# Patient Record
Sex: MALE | Race: WHITE | Employment: OTHER | URBAN - METROPOLITAN AREA
[De-identification: names, ages, dates, MRNs, and addresses within clinical notes are randomized per-mention and may not be internally consistent; named-entity substitution may affect disease eponyms.]

---

## 2018-12-14 RX ORDER — FLUTICASONE PROPIONATE 50 MCG
2 SPRAY, SUSPENSION (ML) NASAL
COMMUNITY

## 2018-12-14 NOTE — PERIOP NOTES
[address], [address] [facility]                            MAIN OR                                  [phone]   MAIN PRE OP                          [phone]                                                                                AMBULATORY PRE OP          [phone]  PRE-ADMISSION TESTING    [phone]     Surgery Date:   1/9/2018      Is surgery arrival time given by surgeon? NO  If NO, [facility] staff will call you between 3 and 7pm the day before your surgery with your arrival time. (If your surgery is on a Monday, we will call you the Friday before.)    Call [phone] after 7pm Monday-Friday if you did not receive your arrival time. INSTRUCTIONS BEFORE YOUR SURGERY   When You  Arrive   Arrive at the [address] [facility] on the day of your surgery  Have your insurance card, photo ID, and any copayment (if needed)     Food   and   Drink   NO food or drink after midnight the night before surgery    This means NO water, gum, mints, coffee, juice, etc.  No alcohol (beer, wine, liquor) 24 hours before and after surgery     Medications to   TAKE   Morning of Surgery   MEDICATIONS TO TAKE THE MORNING OF SURGERY WITH A SIP OF WATER:    Flonase, Zyrtec     Medications  To  STOP      7 days before surgery    Non-Steroidal anti-inflammatory Drugs (NSAID's): for example, Ibuprofen (Advil, Motrin), Naproxen (Aleve)   Aspirin, if taking for pain    Herbal supplements, vitamins, and fish oil   Other: Meloxicam  (Pain medications not listed above, including Tylenol may be taken)   Blood  Thinners    If you take  Aspirin, Plavix, Coumadin, or any blood-thinning or anti-blood clot medicine, talk to the doctor who prescribed the medications for pre-operative instructions.      Bathing Clothing  Jewelry  Valuables       If you shower the morning of surgery, please do not apply anything to your skin (lotions, powders, deodorant, or makeup, especially mascara)   Follow all special bath instructions (for total joint replacement, spine and bowel surgeries)   Do not shave or trim anywhere 24 hours before surgery   Wear your hair loose or down; no pony-tails, buns, or metal hair clips   Wear loose, comfortable, clean clothes   Wear glasses instead of contacts   Leave money, valuables, and jewelry, including body piercings, at home     Going Home       or Spending the Night    SAME-DAY SURGERY: You must have a responsible adult drive you home and stay with you 24 hours after surgery   ADMITS: If your doctor is keeping you into the hospital after surgery, leave personal belongings/luggage in your car until you have a hospital room number. Hospital discharge time is 12 noon  Drivers must be here before 12 noon unless you are told differently   Special Instructions Free  parking 7am-5pm         Follow all instructions so your surgery wont be cancelled. Please, be on time. If a situation occurs and you are delayed the day of surgery, call [phone]. If your physical condition changes (like a fever, cold, flu, etc.) call your surgeon. The patient was contacted  via phone. The patient verbalizes understanding of all instructions and does not  need reinforcement.

## 2019-01-22 ENCOUNTER — ANESTHESIA EVENT (OUTPATIENT)
Dept: SURGERY | Age: 69
End: 2019-01-22
Payer: MEDICARE

## 2019-01-23 ENCOUNTER — HOSPITAL ENCOUNTER (OUTPATIENT)
Age: 69
Setting detail: OUTPATIENT SURGERY
Discharge: HOME | End: 2019-01-23
Attending: ORTHOPAEDIC SURGERY | Admitting: ORTHOPAEDIC SURGERY
Payer: MEDICARE

## 2019-01-23 ENCOUNTER — ANESTHESIA (OUTPATIENT)
Dept: SURGERY | Age: 69
End: 2019-01-23
Payer: MEDICARE

## 2019-01-23 VITALS
HEART RATE: 57 BPM | OXYGEN SATURATION: 97 % | WEIGHT: 174.38 LBS | SYSTOLIC BLOOD PRESSURE: 143 MMHG | HEIGHT: 71 IN | RESPIRATION RATE: 15 BRPM | DIASTOLIC BLOOD PRESSURE: 92 MMHG | TEMPERATURE: 97.6 F | BODY MASS INDEX: 24.41 KG/M2

## 2019-01-23 DIAGNOSIS — G56.02 CARPAL TUNNEL SYNDROME ON LEFT: Primary | ICD-10-CM

## 2019-01-23 PROCEDURE — A4565 SLINGS: HCPCS

## 2019-01-23 PROCEDURE — 77030002986 HC SUT PROL J&J -A: Performed by: ORTHOPAEDIC SURGERY

## 2019-01-23 PROCEDURE — 74011000272 HC RX REV CODE- 272: Performed by: ORTHOPAEDIC SURGERY

## 2019-01-23 PROCEDURE — 77030003601 HC NDL NRV BLK BBMI -A

## 2019-01-23 PROCEDURE — 74011250636 HC RX REV CODE- 250/636: Performed by: ANESTHESIOLOGY

## 2019-01-23 PROCEDURE — 77030018836 HC SOL IRR NACL ICUM -A: Performed by: ORTHOPAEDIC SURGERY

## 2019-01-23 PROCEDURE — 77030006602 HC BLD CRPL AGEE MCRA -C: Performed by: ORTHOPAEDIC SURGERY

## 2019-01-23 PROCEDURE — 77030006689 HC BLD OPHTH BVR BD -A: Performed by: ORTHOPAEDIC SURGERY

## 2019-01-23 PROCEDURE — 77030020782 HC GWN BAIR PAWS FLX 3M -B

## 2019-01-23 PROCEDURE — 76060000073 HC AMB SURG ANES FIRST 0.5 HR: Performed by: ORTHOPAEDIC SURGERY

## 2019-01-23 PROCEDURE — 74011000250 HC RX REV CODE- 250: Performed by: ORTHOPAEDIC SURGERY

## 2019-01-23 PROCEDURE — 76210000046 HC AMBSU PH II REC FIRST 0.5 HR: Performed by: ORTHOPAEDIC SURGERY

## 2019-01-23 PROCEDURE — 74011250636 HC RX REV CODE- 250/636

## 2019-01-23 PROCEDURE — 74011250636 HC RX REV CODE- 250/636: Performed by: ORTHOPAEDIC SURGERY

## 2019-01-23 PROCEDURE — 76030000002 HC AMB SURG OR TIME FIRST 0.: Performed by: ORTHOPAEDIC SURGERY

## 2019-01-23 PROCEDURE — 77030000032 HC CUF TRNQT ZIMM -B: Performed by: ORTHOPAEDIC SURGERY

## 2019-01-23 PROCEDURE — 76210000034 HC AMBSU PH I REC 0.5 TO 1 HR: Performed by: ORTHOPAEDIC SURGERY

## 2019-01-23 RX ORDER — HYDROMORPHONE HYDROCHLORIDE 1 MG/ML
.25-1 INJECTION, SOLUTION INTRAMUSCULAR; INTRAVENOUS; SUBCUTANEOUS
Status: DISCONTINUED | OUTPATIENT
Start: 2019-01-23 | End: 2019-01-23 | Stop reason: HOSPADM

## 2019-01-23 RX ORDER — NALOXONE HYDROCHLORIDE 0.4 MG/ML
0.04 INJECTION, SOLUTION INTRAMUSCULAR; INTRAVENOUS; SUBCUTANEOUS
Status: DISCONTINUED | OUTPATIENT
Start: 2019-01-23 | End: 2019-01-23 | Stop reason: HOSPADM

## 2019-01-23 RX ORDER — FENTANYL CITRATE 50 UG/ML
INJECTION, SOLUTION INTRAMUSCULAR; INTRAVENOUS AS NEEDED
Status: DISCONTINUED | OUTPATIENT
Start: 2019-01-23 | End: 2019-01-23 | Stop reason: HOSPADM

## 2019-01-23 RX ORDER — CEFAZOLIN SODIUM/WATER 2 G/20 ML
SYRINGE (ML) INTRAVENOUS
Status: DISCONTINUED
Start: 2019-01-23 | End: 2019-01-23 | Stop reason: WASHOUT

## 2019-01-23 RX ORDER — SODIUM CHLORIDE 0.9 % (FLUSH) 0.9 %
5-40 SYRINGE (ML) INJECTION AS NEEDED
Status: DISCONTINUED | OUTPATIENT
Start: 2019-01-23 | End: 2019-01-23 | Stop reason: HOSPADM

## 2019-01-23 RX ORDER — SODIUM CHLORIDE 0.9 % (FLUSH) 0.9 %
5-40 SYRINGE (ML) INJECTION EVERY 8 HOURS
Status: DISCONTINUED | OUTPATIENT
Start: 2019-01-23 | End: 2019-01-23 | Stop reason: HOSPADM

## 2019-01-23 RX ORDER — PROPOFOL 10 MG/ML
INJECTION, EMULSION INTRAVENOUS
Status: DISCONTINUED | OUTPATIENT
Start: 2019-01-23 | End: 2019-01-23 | Stop reason: HOSPADM

## 2019-01-23 RX ORDER — LIDOCAINE HYDROCHLORIDE 10 MG/ML
0.1 INJECTION, SOLUTION EPIDURAL; INFILTRATION; INTRACAUDAL; PERINEURAL AS NEEDED
Status: DISCONTINUED | OUTPATIENT
Start: 2019-01-23 | End: 2019-01-23 | Stop reason: HOSPADM

## 2019-01-23 RX ORDER — HYDROCODONE BITARTRATE AND ACETAMINOPHEN 5; 325 MG/1; MG/1
TABLET ORAL
Qty: 20 TAB | Refills: 0 | Status: SHIPPED
Start: 2019-01-23 | End: 2021-05-19

## 2019-01-23 RX ORDER — PROPOFOL 10 MG/ML
INJECTION, EMULSION INTRAVENOUS AS NEEDED
Status: DISCONTINUED | OUTPATIENT
Start: 2019-01-23 | End: 2019-01-23 | Stop reason: HOSPADM

## 2019-01-23 RX ORDER — CEFAZOLIN SODIUM/WATER 2 G/20 ML
2 SYRINGE (ML) INTRAVENOUS ONCE
Status: COMPLETED | OUTPATIENT
Start: 2019-01-23 | End: 2019-01-23

## 2019-01-23 RX ORDER — DIPHENHYDRAMINE HYDROCHLORIDE 50 MG/ML
12.5 INJECTION, SOLUTION INTRAMUSCULAR; INTRAVENOUS AS NEEDED
Status: DISCONTINUED | OUTPATIENT
Start: 2019-01-23 | End: 2019-01-23 | Stop reason: HOSPADM

## 2019-01-23 RX ORDER — SODIUM CHLORIDE, SODIUM LACTATE, POTASSIUM CHLORIDE, CALCIUM CHLORIDE 600; 310; 30; 20 MG/100ML; MG/100ML; MG/100ML; MG/100ML
125 INJECTION, SOLUTION INTRAVENOUS CONTINUOUS
Status: DISCONTINUED | OUTPATIENT
Start: 2019-01-23 | End: 2019-01-23 | Stop reason: HOSPADM

## 2019-01-23 RX ORDER — FLUMAZENIL 0.1 MG/ML
0.2 INJECTION INTRAVENOUS
Status: DISCONTINUED | OUTPATIENT
Start: 2019-01-23 | End: 2019-01-23 | Stop reason: HOSPADM

## 2019-01-23 RX ORDER — MIDAZOLAM HYDROCHLORIDE 1 MG/ML
INJECTION, SOLUTION INTRAMUSCULAR; INTRAVENOUS AS NEEDED
Status: DISCONTINUED | OUTPATIENT
Start: 2019-01-23 | End: 2019-01-23 | Stop reason: HOSPADM

## 2019-01-23 RX ADMIN — PROPOFOL 30 MG: 10 INJECTION, EMULSION INTRAVENOUS at 13:16

## 2019-01-23 RX ADMIN — Medication 2 G: at 13:19

## 2019-01-23 RX ADMIN — MIDAZOLAM HYDROCHLORIDE 1 MG: 1 INJECTION, SOLUTION INTRAMUSCULAR; INTRAVENOUS at 13:19

## 2019-01-23 RX ADMIN — FENTANYL CITRATE 50 MCG: 50 INJECTION, SOLUTION INTRAMUSCULAR; INTRAVENOUS at 12:04

## 2019-01-23 RX ADMIN — PROPOFOL 50 MCG/KG/MIN: 10 INJECTION, EMULSION INTRAVENOUS at 13:16

## 2019-01-23 RX ADMIN — MIDAZOLAM HYDROCHLORIDE 1 MG: 1 INJECTION, SOLUTION INTRAMUSCULAR; INTRAVENOUS at 12:04

## 2019-01-23 RX ADMIN — SODIUM CHLORIDE, POTASSIUM CHLORIDE, SODIUM LACTATE AND CALCIUM CHLORIDE: 600; 310; 30; 20 INJECTION, SOLUTION INTRAVENOUS at 12:51

## 2019-01-23 RX ADMIN — FENTANYL CITRATE 50 MCG: 50 INJECTION, SOLUTION INTRAMUSCULAR; INTRAVENOUS at 13:19

## 2019-01-23 NOTE — ANESTHESIA PROCEDURE NOTES
Peripheral Block Start time: 1/23/2019 12:04 PM 
End time: 1/23/2019 12:12 PM 
Performed by: Jefe Link MD 
Authorized by: Jefe Link MD  
 
 
Pre-procedure: Indications: at surgeon's request and primary anesthetic Preanesthetic Checklist: patient identified, risks and benefits discussed, site marked, timeout performed, anesthesia consent given and patient being monitored Timeout Time: 12:04 Block Type:  
Block Type:  Supraclavicular Laterality:  Left Monitoring:  Continuous pulse ox, frequent vital sign checks, heart rate, responsive to questions and oxygen Injection Technique:  Single shot Procedures: ultrasound guided and nerve stimulator Patient Position: supine Prep: chlorhexidine Location:  Supraclavicular Needle Type:  Stimuplex Needle Gauge:  22 G Needle Localization:  Anatomical landmarks and ultrasound guidance Assessment: 
Number of attempts:  1 Injection Assessment:  Incremental injection every 5 mL, local visualized surrounding nerve on ultrasound, negative aspiration for blood, no paresthesia and no intravascular symptoms Patient tolerance:  Patient tolerated the procedure well with no immediate complications

## 2019-01-23 NOTE — ANESTHESIA POSTPROCEDURE EVALUATION
Procedure(s): LEFT ENDOSCOPIC CARPAL TUNNEL RELEASE (AXILLARY BLOCK). Anesthesia Post Evaluation Multimodal analgesia: multimodal analgesia not used between 6 hours prior to anesthesia start to PACU discharge Patient location during evaluation: bedside Patient participation: complete - patient participated Level of consciousness: awake Pain management: adequate Airway patency: patent Anesthetic complications: no 
Cardiovascular status: acceptable Respiratory status: acceptable Hydration status: acceptable Post anesthesia nausea and vomiting:  controlled Visit Vitals /87 Pulse 67 Temp 36.5 °C (97.7 °F) Resp 11 Ht 5' 11\" (1.803 m) Wt 79.1 kg (174 lb 6.1 oz) SpO2 96% BMI 24.32 kg/m²

## 2019-01-23 NOTE — ANESTHESIA PREPROCEDURE EVALUATION
Anesthetic History No history of anesthetic complications Review of Systems / Medical History Patient summary reviewed, nursing notes reviewed and pertinent labs reviewed Pulmonary Within defined limits Neuro/Psych Within defined limits Cardiovascular Within defined limits Exercise tolerance: >4 METS 
  
GI/Hepatic/Renal 
Within defined limits Endo/Other Within defined limits Arthritis Other Findings Comments: Spinal stenosis Physical Exam 
 
Airway Mallampati: II 
 
Neck ROM: normal range of motion Mouth opening: Normal 
 
 Cardiovascular Rhythm: regular Rate: normal 
 
 
 
 Dental 
 
Dentition: Poor dentition Pulmonary Breath sounds clear to auscultation Abdominal 
GI exam deferred Other Findings Anesthetic Plan ASA: 2 Anesthesia type: MAC and regional 
 
 
 
 
Induction: Intravenous Anesthetic plan and risks discussed with: Patient

## 2019-01-23 NOTE — H&P
Date of Surgery Update:  Corina Galeazzi was seen and examined. History and physical has been reviewed. The patient has been examined.  There have been no significant clinical changes since the completion of the originally dated History and Physical.    Signed By: MELISSA Jarquin     January 23, 2019 1:11 PM

## 2019-01-23 NOTE — DISCHARGE INSTRUCTIONS
DISCHARGE SUMMARY from your Nurse      PATIENT INSTRUCTIONS    After general anesthesia or intravenous sedation, for 24 hours or while taking prescription Narcotics:  · Limit your activities  · Do not drive and operate hazardous machinery  · Do not make important personal or business decisions  · Do  not drink alcoholic beverages  · If you have not urinated within 8 hours after discharge, please contact your surgeon on call. Report the following to your surgeon:  · Excessive pain, swelling, redness or odor of or around the surgical area  · Temperature over 100.5  · Nausea and vomiting lasting longer than 4 hours or if unable to take medications  · Any signs of decreased circulation or nerve impairment to extremity: change in color, persistent  numbness, tingling, coldness or increase pain  · Any questions      COUGH AND DEEP BREATHE    Breathing deeply and coughing are very important exercises to do after surgery. Deep breathing and coughing open the little air tubes and air sacks in your lungs. You take deep breaths every day. You may not even notice - it is just something you do when you sigh or yawn. It is a natural exercise you do to keep these air passages open. After surgery, take deep breaths and cough, on purpose. DIRECTIONS:  · Take 10 to 15 slow deep breaths every hour while awake. · Breathe in deeply, and hold it for 2 seconds. · Exhale slowly through puckered lips, like blowing up a balloon. · After every 4th or 5th deep breath, hug your pillow to your chest or belly and give a hard, deep cough. Yes, it will probably hurt. But doing this exercise is a very important part of healing after surgery. Take your pain medicine to help you do this exercise without too much pain. Coughing and deep breathing help prevent bronchitis and pneumonia after surgery.   If you had chest or belly surgery, use a pillow as a \"hug buddy\" and hold it tightly to your chest or belly when you cough.       ANKLE PUMPS    Ankle pumps increase the circulation of oxygenated blood to your lower extremities and decrease your risk for circulation problems such as blood clots. They also stretch the muscles, tendons and ligaments in your foot and ankle, and prevent joint contracture in the ankle and foot, especially after surgeries on the legs. It is important to do ankle pump exercises regularly after surgery because immobility increases your risk for developing a blood clot. Your doctor may also have you take an Aspirin for the next few days as well. If your doctor did not ask you to take an Aspirin, consult with him before starting Aspirin therapy on your own. The exercise is quite simple. · Slowly point your foot forward, feeling the muscles on the top of your lower leg stretch, and hold this position for 5 seconds. · Next, pull your foot back toward you as far as possible, stretching the calf muscles, and hold that position for 5 seconds. · Repeat with the other foot. · Perform 10 repetitions every hour while awake for both ankles if possible (down and then up with the foot once is one repetition). You should feel gentle stretching of the muscles in your lower leg when doing this exercise. If you feel pain, or your range of motion is limited, don't push too hard. Only go the limit your joint and muscles will let you go. If you have increasing pain, progressively worsening leg warmth or swelling, STOP the exercise and call your doctor. MEDICATION AND   SIDE EFFECT GUIDE    The [employer] MEDICATION AND SIDE EFFECT GUIDE was provided to the PATIENT AND CARE PROVIDER. Information provided includes instruction about drug purpose and common side effects for the following medications:   · Norco        These are general instructions for a healthy lifestyle:    *   Please give a list of your current medications to your Primary Care Provider.   * Please update this list whenever your medications are discontinued, doses are changed, or new medications (including over-the-counter products) are added. *   Please carry medication information at all times in case of emergency situations. About Smoking  No smoking / No tobacco products  Avoid exposure to second hand smoke     Surgeon General's Warning:  Quitting smoking now greatly reduces serious risk to your health. Obesity, smoking, and sedentary lifestyle greatly increases your risk for illness and disease. A healthy diet, regular physical exercise & weight monitoring are important for maintaining a healthy lifestyle. Congestive Heart Failure  You may be retaining fluid if you have a history of heart failure or if you experience any of the following symptoms:  Weight gain of 3 pounds or more overnight or 5 pounds in a week, increased swelling in your hands or feet or shortness of breath while lying flat in bed. Please call your doctor as soon as you notice any of these symptoms; do not wait until your next office visit. Recognize signs and symptoms of STROKE:  F -  Face looks uneven  A -  Arms unable to move or move evenly  S -  Speech slurred or non-existent  T -  Time-call 911 as soon as signs and symptoms begin-DO NOT go          back to bed or wait to see if you get better-TIME IS BRAIN. Warning Signs of HEART ATTACK   Call 911 if you have these symptoms:     Chest discomfort. Most heart attacks involve discomfort in the center of the chest that lasts more than a few minutes, or that goes away and comes back. It can feel like uncomfortable pressure, squeezing, fullness, or pain.  Discomfort in other areas of the upper body. Symptoms can include pain or discomfort in one or both arms, the back, neck, jaw, or stomach.  Shortness of breath with or without chest discomfort.  Other signs may include breaking out in a cold sweat, nausea, or lightheadedness.     Don't wait more than five minutes to call 911 - MINUTES MATTER! Fast action can save your life. Calling 911 is almost always the fastest way to get lifesaving treatment. Emergency Medical Services staff can begin treatment when they arrive -- up to an hour sooner than if someone gets to the hospital by car. The discharge information has been reviewed with the patient and friend. Any questions and concerns from the patient and friend have been addressed. The patient and friend verbalized understanding. Other information in your discharge envelope:  []     PRESCRIPTIONS  []     PHYSICAL THERAPY PRESCRIPTION  []     APPOINTMENT CARDS  []     Regional Anesthesia Pamphlet for block or block with On-Q Catheter from   Anesthesia Service  []     Medical device information sheets/pamphlets from their    []     School/work excuse note. []     /parent work excuse note. The following personal items collected during your admission are returned to you:   Dental Appliance: Dental Appliances: None  Vision: Visual Aid: Glasses  Hearing Aid:    Jewelry: Jewelry: None  Clothing: Clothing: Footwear, Pants, Shirt, Socks, Undergarments  Other Valuables: Other Valuables: Wallet(Left with friend)  Valuables sent to safe:  P.R.I.C.E. INSTRUCTIONS    PRICE is an acronym that stands for Protect, Rest, Ice, Compression, and Elevation (sometimes you might see the acronym RICE.)   Listed below are five activities one can do for an injured limb or soft tissue injury. While much anecdotal understanding learned through many years of experience supports these seemingly common sense treatments, building scientific evidence is showing how and why these treatment principles are proving to be so beneficial.  Below is a breakdown of what the PRICE principles entail to speed healing along. PROTECT may sound like an obvious thing to do, and really, it is common sense.   After an injury or surgery, protecting the site that hurts help to prevent further injury. REST is essential for an injured limb. Like protection, the more you are up on an injured limb, especially in the early stages of an injury, the more damage you can do. Rest means no activities that would involve the use of the injured tissues so that the early stages of healing can begin without  interruption. ICE \"is perhaps the simplest and oldest [therapy] in the treatment of soft tissue injuries. \"4    Ice help decrease swelling in inflamed and damaged tissues, can diminish the feeling of pain and decrease muscle spasms, and, immediately after an injury,  can slow cellular metabolism and help to prevent further tissue injury from oxygen starvation caused by the swelling. 5      COMPRESSION help decrease pain by limiting movement of an injured limb. Compression can be found through the use of an elastic wrap bandage, a cast, splint, or simply a snug cooling cuff or an ice pack and pillow. ELEVATION is a very important intervention. Placing the injury above the level of the heart whenever possible helps decrease swelling by using gravity to one's advantage . Placing the injury above the level of the heart also helps prevent, or at least decrease, the throbbing pain that is sometimes experienced after surgery or injury. Sources:  7. Muscle injuries: optimizing recovery (2007) Best Practice & Research Clinical Rheumatology Vol. 21, No. 2, pp 281-289, Accessed 9/26/11    8. PRICE first aid guidelines - Protection, Rest, Ice, Compression and Elevation By Robby Brown [employer]. com Guide, Updated March 27, 2011, Accessed 9/26/11 http://sportsmedicine. [employer].com/cs/rehab/a/rice. htm    9. Rest Ice Compression Elevation: RICE for injuries, Accessed 9/26/11 [employer]. com/rest-ice-compression-elevation. html    10.  The use of ice in the treatment of acute soft-tissue injury (2004) [place], Vol. 32, No. 1, pp 251-261, Accessed 9/26/11 http://ajs.Snapguidepub.com/content/32/1/251.full.pdf+html    Soft tissue damage and healing; theory and techniques, www.iaaf.org, Ch. 9 of  medical page, by yovani Russell And Ines Lopes 9/27/11 [facility].gl. pdf            Rodney Counter    MD Dr. Chon Weber MD Dr. Brigitte Pax. Jayden Robison MD    You have undergone surgery by one of our hand specialists. Please follow these instructions to ensure a safe and speedy recovery. 1. SURGICAL DRESSING (bandage): Your bandage should be kept dry and in place until you return to the office for your follow up visit. This helps to guard against infection. [x]         You can shower if you place a plastic bag over your dressing.    []         You will need to sponge bathe until you are seen in the office. 2. ELEVATION:  It is VERY IMPORTANT that you keep your arm and hand above the level of your heart at all times, awake or asleep. The higher you elevate your hand, the less it will swell, and the less it will hurt. It is best to elevate the hand as shown below:              Laying down, especially at night,  with your arm elevated on pillows help keep your shoulder and elbow from getting stiff. 3. Ice bags / ice packs can be used to help control pain. If you make your own ice bag, double-bagging helps to prevent leaks. 4. MEDICATIONS:  You have been given a prescription for pain medications. Take it according to the instructions on the bottle. DO NOT drink alcohol while taking pain medications. DO NOT drive, operate heavy machinery, or make important personal or business decisions since the medications will make you drowsy. We do NOT refill prescriptions over the weekend. Please arrange to call for prescription refills during regular office hours.     5. APPOINTMENT:  Your post operative appointment has been made for the following time:    TIME:   2:00pm            DATE:   2/6/19         At the:        [x]  [facility] Office    6. AFTER GENERAL ANESTHESIA or IV SEDATION:   DO NOT drink alcohol or drive, work around [place] [address], or make important personal or business decisions. Limit your activity for 24 hours. Resume your diet with light foods (Jell-o ®, clear soups, clear fluids, caffeine-free drinks). If you do not have any nausea, you may resume your regular diet. We at [address] [place] [address] want your surgery and recovery to be as comfortable and successful as possible. Should you have problems, please call our office during the morning hours. In particular, call if your pain is not adequately controlled by prescribed medication, temperature is over 101 degrees, or your bandage is wet or has a four odor. Your doctor contact information:   [facility] [phone]  Michelle Campoverde, ext [phone] [facility] END OFFICE - [address] [facility], [address]  [address]  [place], [address] [facility]

## 2019-01-24 NOTE — OP NOTES
[facility] 77  OPERATIVE REPORT    Randi Dowd  MR#: [id number]  : 1950  ACCOUNT #: [de-identified]   DATE OF SERVICE: 2019    PREOPERATIVE DIAGNOSIS:  Left carpal tunnel syndrome. POSTOPERATIVE DIAGNOSIS:  Left carpal tunnel syndrome. PROCEDURE PERFORMED:  Left carpal tunnel release endoscopic. SURGEON:  DYLAN Sol MD    ASSISTANT:  Sergio Casillas. ANESTHESIA:  Axillary block. COMPLICATIONS:  None. IMPLANTS:      ESTIMATED BLOOD LOSS:  Zero. SPECIMENS REMOVED:  none     TOURNIQUET TIME:  10 minutes. INDICATION:  The patient is a 13-year-old male with a history of numbness, tingling and pain in his left hand. Nerve testing has revealed significant carpal tunnel syndrome. He was counseled regarding surgical and nonsurgical treatment options and elected to proceed with procedure as above. The risks and benefits were discussed in detail. DESCRIPTION OF PROCEDURE:  The patient was taken to the operating room and placed supine on the operating table. Following administration of axillary block anesthetic, the left arm prepped and draped in sterile fashion with Hibiclens and alcohol. A tourniquet was applied to left proximal arm. The arm was exsanguinated with an Esmarch bandage and the tourniquet inflated to 250 mmHg. An incision was made in the distal forearm parallel to volar wrist crease. Subcutaneous dissection was carried out and the palmaris longus tendon retracted. Hemostasis achieved with bipolar cautery. The distal forearm fascia was released exposing the median nerve. Sequential dilators placed along the nerve beneath the transverse carpal ligament through the carpal canal.  The endoscope was placed into the space created by the dilators. The median nerve flexor tendon and distal transverse carpal ligament were identified endoscopically.   The endoscopic blade was deployed and the transcarpal ligament divided from distal to proximal.  The nerve was noted to be flat and hyperemic at the level of the canal.  The wound was copiously irrigated with sterile saline. Hemostasis achieved with bipolar cautery. The wound was repaired. The nerve was noted to be flattened and hyperemic at the level of the canal.    The wound was repaired using a 5-0 Prolene subcuticular with benzoin and Steri-Strips. A sterile bulky soft hand dressing was applied and the tourniquet let down. Patient was awakened from anesthesia and discharged to the recovery room in stable condition. During the procedure, a physician assistant was vital to the outcome of the case, providing stability to the arm, retraction of critical structures and assistance with wound closure. DISCHARGE PLAN:  The patient is instructed to keep arm elevated, clean and dry at all times, to call with any questions or concerns. Follow up in 7-10 days for suture removal, wound check and hand therapy referral if indicated.       MD SHANTEL Lay / JOSHUA  D: 01/24/2019 08:22     T: 01/24/2019 09:12  JOB #: [id number]

## 2019-01-28 ENCOUNTER — OFFICE VISIT (OUTPATIENT)
Dept: SURGERY | Age: 69
End: 2019-01-28

## 2019-01-28 VITALS
OXYGEN SATURATION: 99 % | HEART RATE: 47 BPM | DIASTOLIC BLOOD PRESSURE: 81 MMHG | HEIGHT: 71 IN | WEIGHT: 168 LBS | SYSTOLIC BLOOD PRESSURE: 140 MMHG | BODY MASS INDEX: 23.52 KG/M2 | RESPIRATION RATE: 20 BRPM | TEMPERATURE: 97.1 F

## 2019-01-28 DIAGNOSIS — M62.08 RECTUS DIASTASIS: Primary | ICD-10-CM

## 2019-01-28 RX ORDER — OMEPRAZOLE 40 MG/1
40 CAPSULE, DELAYED RELEASE ORAL DAILY
COMMUNITY
End: 2021-05-19

## 2019-01-28 RX ORDER — RANITIDINE 300 MG/1
TABLET ORAL 2 TIMES DAILY
COMMUNITY
End: 2021-05-19

## 2019-01-28 RX ORDER — GABAPENTIN 100 MG/1
CAPSULE ORAL 3 TIMES DAILY
COMMUNITY
End: 2021-05-19

## 2019-01-28 NOTE — PROGRESS NOTES
Discussed advanced directive. Patient states that he does  have an advanced directive. 1. Have you been to the ER, urgent care clinic since your last visit? Hospitalized since your last visit? New patient    2. Have you seen or consulted any other health care providers outside of the [address] [place] [address] since your last visit? Include any pap smears or colon screening.  New patient    Chief Complaint   Patient presents with    Abdominal Pain     seen at the request of Dr. Yesica matt diastasis recti

## 2019-01-28 NOTE — PROGRESS NOTES
HISTORY OF PRESENT ILLNESS  Chepe Lord is a 77 y.o. male who comes in for consultation by Dr Gilbert Clark for abdominal swelling  HPI  He has had upper midline swelling when sitting up as well as left side swelling for 6-12 months. He had an umbilical hernia repaired in the fall 2018. He has chronic back pain and has been getting ESIs by DR Erik Sequeira. There is concern that the abdominal swelling may be causing the back issues per the patient. He denies associated nausea, vomiting, diarrhea, constipation, melena, hematochezia, dysuria, or hematuria. Past Medical History:   Diagnosis Date    Basal cell carcinoma     Depression     patient denies as of 12/14/2018    GERD (gastroesophageal reflux disease)     Non Hodgkin's lymphoma ([facility] [address]     post radiation    Osteoarthritis     Spinal stenosis      Past Surgical History:   Procedure Laterality Date    HX HERNIA REPAIR      x2    HX KNEE REPLACEMENT Bilateral     HX KNEE REPLACEMENT Left     Trigger finger release    HX ROTATOR CUFF REPAIR Right     HX TONSILLECTOMY       History reviewed. No pertinent family history. Social History     Tobacco Use    Smoking status: Former Smoker    Smokeless tobacco: Former User     Quit date: 1980    Tobacco comment: socially as a teenager   Substance Use Topics    Alcohol use: Yes     Alcohol/week: 0.0 - 0.6 oz    Drug use: No     Current Outpatient Medications   Medication Sig    omeprazole (PRILOSEC) 40 mg capsule Take 40 mg by mouth daily.  raNITIdine (ZANTAC) 300 mg tab Take  by mouth two (2) times a day.  gabapentin (NEURONTIN) 100 mg capsule Take  by mouth three (3) times daily.  fluticasone (FLONASE ALLERGY RELIEF) 50 mcg/actuation nasal spray 2 Sprays by Both Nostrils route daily as needed for Rhinitis.  atorvastatin (LIPITOR) 20 mg tablet Take 20 mg by mouth every evening.  cetirizine (ZYRTEC) 10 mg tablet Take 10 mg by mouth daily as needed.     HYDROcodone-acetaminophen (NORCO) 5-325 mg per tablet 1 tab PO Q 4-6hrs PRN    meloxicam (MOBIC) 7.5 mg tablet Take 3.75 mg by mouth two (2) times a day. No current facility-administered medications for this visit. No Known Allergies    Review of Systems   Constitutional: Negative for chills, diaphoresis, fever, malaise/fatigue and weight loss. HENT: Negative for congestion, ear pain and sore throat. Eyes: Negative for blurred vision and pain. Respiratory: Negative for cough, hemoptysis, sputum production, shortness of breath, wheezing and stridor. Cardiovascular: Negative for chest pain, palpitations, orthopnea, claudication, leg swelling and PND. Gastrointestinal: Positive for constipation. Negative for abdominal pain, blood in stool, diarrhea, heartburn (mild dysphagia), melena, nausea and vomiting. Genitourinary: Negative for dysuria, flank pain, frequency, hematuria and urgency. Musculoskeletal: Positive for back pain and joint pain. Negative for myalgias and neck pain. Skin: Negative for itching and rash. Neurological: Negative for dizziness, tremors, focal weakness, seizures, weakness and headaches. Endo/Heme/Allergies: Negative for polydipsia. Bruises/bleeds easily. Psychiatric/Behavioral: Negative for depression and memory loss. The patient is not nervous/anxious. Visit Vitals  /81 (BP 1 Location: Right arm, BP Patient Position: Sitting)   Pulse (!) 47   Temp 97.1 °F (36.2 °C)   Resp 20   Ht 5' 11\" (1.803 m)   Wt 76.2 kg (168 lb)   SpO2 99%   BMI 23.43 kg/m²       Physical Exam   Constitutional: He is oriented to person, place, and time. He appears well-developed and well-nourished. No distress. HENT:   Head: Normocephalic and atraumatic. Mouth/Throat: Oropharynx is clear and moist. No oropharyngeal exudate. Eyes: Conjunctivae and EOM are normal. Pupils are equal, round, and reactive to light. No scleral icterus. Neck: Normal range of motion. Neck supple.  No tracheal deviation present. No thyromegaly present. Cardiovascular: Normal rate, regular rhythm and normal heart sounds. Exam reveals no gallop and no friction rub. No murmur heard. Pulmonary/Chest: Effort normal and breath sounds normal. No stridor. No respiratory distress. He has no wheezes. He has no rales. Abdominal: Soft. Normal appearance and bowel sounds are normal. He exhibits no distension, no pulsatile liver and no mass. There is no hepatosplenomegaly. There is no tenderness. There is no rebound, no guarding, no CVA tenderness and negative Iyer's sign. No hernia. Hernia confirmed negative in the ventral area (moderate rectus diastasis in upper midline but no hernia), confirmed negative in the right inguinal area and confirmed negative in the left inguinal area. Genitourinary: Testes normal. Cremasteric reflex is present. Circumcised. Musculoskeletal: Normal range of motion. He exhibits no edema or tenderness. Lymphadenopathy:     He has no cervical adenopathy. Right: No inguinal adenopathy present. Left: No inguinal adenopathy present. Neurological: He is alert and oriented to person, place, and time. No cranial nerve deficit. Coordination normal.   Skin: Skin is warm and dry. No rash noted. He is not diaphoretic. No erythema. Psychiatric: He has a normal mood and affect. His behavior is normal. Judgment and thought content normal.       ASSESSMENT and PLAN  1. Rectus diastasis. I explained to him about the anatomy and pathophysiology of rectus diastasis as well as herniae. I explained that it is primarily aesthetic at this time and not likely a source for his back pain. There is risk for hernia development but nothing is present at this time. 2.  Chronic back pain. Continue rx per Dr Florencio Alcantar  3. Osteoarthritis. S/p B TKR. 4.  Hx NH lymphoma. Dx 2010 in remission  5. GERD/dysphagia.   Worked up by GI    I favor observation at this time and he understands and agrees    Aurelia Carlos Belem Jimenez MD FACS

## 2020-07-25 ENCOUNTER — HOSPITAL ENCOUNTER (OUTPATIENT)
Dept: PREADMISSION TESTING | Age: 70
Discharge: HOME | End: 2020-07-25

## 2020-10-01 NOTE — BRIEF OP NOTE
BRIEF OPERATIVE NOTE    Date of Procedure: 1/23/2019   Preoperative Diagnosis: LEFT CARPAL TUNNEL SYNDROME  Postoperative Diagnosis: LEFT CARPAL TUNNEL SYNDROME    Procedure(s):  LEFT ENDOSCOPIC CARPAL TUNNEL RELEASE (AXILLARY BLOCK)  Surgeon(s) and Role:     * Pelon Arcos MD - Primary         Surgical Assistant: none    Surgical Staff:  Circ-1: Kendra Gaona RN  Physician Assistant: MELISSA Reyes  Scrub Tech-1: Levi HARRIS  Event Time In Time Out   Incision Start 1331    Incision Close 1339      Anesthesia: Other   Estimated Blood Loss: none  Specimens: * No specimens in log *   Findings: compression left median nerve   Complications: none  Implants: * No implants in log * Patient's appointment is scheduled for a TCM Video Visit on 10/8 @ 12:30pm.

## (undated) DEVICE — LIGHT HANDLE: Brand: DEVON

## (undated) DEVICE — STERILE POLYISOPRENE POWDER-FREE SURGICAL GLOVES: Brand: PROTEXIS

## (undated) DEVICE — SUT PROL 5-0 18IN P3 BLU --

## (undated) DEVICE — SOL IRRIGATION INJ NACL 0.9% 500ML BTL

## (undated) DEVICE — KERLIX BANDAGE ROLL: Brand: KERLIX

## (undated) DEVICE — SOL ANTI-FOG 6ML MEDC -- MEDICHOICE - CONVERT TO 358427

## (undated) DEVICE — MASTISOL ADHESIVE LIQ 2/3ML

## (undated) DEVICE — HAND I-LF: Brand: MEDLINE INDUSTRIES, INC.

## (undated) DEVICE — BANDAGE COMPR SELF ADH 5 YDX3 IN TAN NS PREMIERPRO LTX

## (undated) DEVICE — STANDARD (U) BLADE ASSEMBLY 6PK: Brand: MICROAIRE®

## (undated) DEVICE — TOWEL,OR,DSP,ST,BLUE,STD,2/PK,40PK/CS: Brand: MEDLINE

## (undated) DEVICE — DEVON™ KNEE AND BODY STRAP 60" X 3" (1.5 M X 7.6 CM): Brand: DEVON

## (undated) DEVICE — (D)STRIP SKN CLSR 0.5X4IN WHT --

## (undated) DEVICE — SUPER SPONGES,MEDIUM: Brand: DERMACEA

## (undated) DEVICE — 1010 S-DRAPE TOWEL DRAPE 10/BX: Brand: STERI-DRAPE™

## (undated) DEVICE — BIPOLAR FORCEPS CORD: Brand: VALLEYLAB

## (undated) DEVICE — IMPERVIOUS SURGICAL GOWN, LG: Brand: CONVERTORS

## (undated) DEVICE — CURITY NON-ADHERENT STRIPS: Brand: CURITY

## (undated) DEVICE — SKIN PREP TRAY W/CHG: Brand: MEDLINE INDUSTRIES, INC.

## (undated) DEVICE — BLADE OPHTH 180DEG CUT SURF BLU STR SHRP DBL BVL GRINDLESS

## (undated) DEVICE — ZIMMER® STERILE DISPOSABLE TOURNIQUET CUFF WITH PROTECTIVE SLEEVE AND PLC, DUAL PORT, SINGLE BLADDER, 18 IN. (46 CM)

## (undated) DEVICE — DRAPE,REIN 53X77,STERILE: Brand: MEDLINE